# Patient Record
Sex: MALE | Race: BLACK OR AFRICAN AMERICAN | Employment: OTHER | RURAL
[De-identification: names, ages, dates, MRNs, and addresses within clinical notes are randomized per-mention and may not be internally consistent; named-entity substitution may affect disease eponyms.]

---

## 2016-10-14 LAB
HEMOCCULT STL QL: NEGATIVE
LDL-C, EXTERNAL: 115

## 2017-08-02 ENCOUNTER — OFFICE VISIT (OUTPATIENT)
Dept: INTERNAL MEDICINE CLINIC | Age: 67
End: 2017-08-02

## 2017-08-02 VITALS
OXYGEN SATURATION: 100 % | HEIGHT: 66 IN | WEIGHT: 124.2 LBS | HEART RATE: 64 BPM | BODY MASS INDEX: 19.96 KG/M2 | DIASTOLIC BLOOD PRESSURE: 87 MMHG | SYSTOLIC BLOOD PRESSURE: 147 MMHG | TEMPERATURE: 95.4 F | RESPIRATION RATE: 17 BRPM

## 2017-08-02 DIAGNOSIS — R56.9 SEIZURES (HCC): ICD-10-CM

## 2017-08-02 DIAGNOSIS — Z76.89 ENCOUNTER TO ESTABLISH CARE: ICD-10-CM

## 2017-08-02 DIAGNOSIS — F32.A DEPRESSION, UNSPECIFIED DEPRESSION TYPE: ICD-10-CM

## 2017-08-02 DIAGNOSIS — I10 ESSENTIAL HYPERTENSION: Primary | ICD-10-CM

## 2017-08-02 RX ORDER — SERTRALINE HYDROCHLORIDE 50 MG/1
50 TABLET, FILM COATED ORAL DAILY
Qty: 30 TAB | Refills: 5 | Status: SHIPPED | OUTPATIENT
Start: 2017-08-02

## 2017-08-02 RX ORDER — HYDROCHLOROTHIAZIDE 25 MG/1
25 TABLET ORAL DAILY
Qty: 90 TAB | Refills: 1 | Status: SHIPPED | OUTPATIENT
Start: 2017-08-02

## 2017-08-02 RX ORDER — PHENYTOIN SODIUM 100 MG/1
100 CAPSULE, EXTENDED RELEASE ORAL DAILY
Qty: 30 CAP | Refills: 5 | Status: SHIPPED | OUTPATIENT
Start: 2017-08-02

## 2017-08-02 NOTE — PROGRESS NOTES
HISTORY OF PRESENT ILLNESS  Taylor Boas is a 61 y.o. male. HPI  Chief Complaint   Patient presents with    New Patient     ESTABLISH CARE    Homeless     STAYING WITH SON, BUT IS LOOKING FOR OWN PLACE;  CURRENTLY PLACED ON WAITING LIST    Depression     WEIGHT LOSS     Past Medical History:   Diagnosis Date    Depression     High cholesterol     Hypertension     Seizure ([address]      Social History     Social History    Marital status: SINGLE     Spouse name: N/A    Number of children: N/A    Years of education: N/A     Occupational History    Not on file. Social History Main Topics    Smoking status: Current Every Day Smoker     Packs/day: 1.50    Smokeless tobacco: Never Used    Alcohol use No    Drug use: Yes     Special: Marijuana      Comment: No longer participate in illicit drugs    Sexual activity: No     Other Topics Concern    Not on file     Social History Narrative    No narrative on file     Review of Systems   Constitutional: Positive for malaise/fatigue and weight loss. Negative for chills, diaphoresis and fever. HENT: Negative. Negative for congestion, ear pain and sore throat. Eyes: Negative. Respiratory: Negative for cough, shortness of breath and wheezing. Cardiovascular: Negative. Negative for chest pain and leg swelling. Gastrointestinal: Negative for abdominal pain, diarrhea, nausea and vomiting. Skin: Negative. Neurological: Negative. Negative for headaches. Psychiatric/Behavioral: Positive for depression. Negative for substance abuse and suicidal ideas. The patient has insomnia. Physical Exam   Constitutional: He is oriented to person, place, and time. He appears well-developed and well-nourished. No distress. HENT:   Head: Normocephalic and atraumatic. Eyes: Conjunctivae are normal.   Neck: Normal range of motion. Neck supple. Cardiovascular: Normal rate, regular rhythm, normal heart sounds and intact distal pulses.   Exam reveals no gallop and no friction rub. No murmur heard. Pulmonary/Chest: Effort normal and breath sounds normal. No respiratory distress. He has no wheezes. Musculoskeletal: Normal range of motion. Neurological: He is alert and oriented to person, place, and time. Skin: Skin is warm and dry. He is not diaphoretic. Psychiatric: He has a normal mood and affect. His behavior is normal.   Nursing note and vitals reviewed. Plan of care and AVS reviewed with patient who verbalized understanding. ASSESSMENT and PLAN    ICD-10-CM ICD-9-CM    1. Essential hypertension I10 401.9 hydroCHLOROthiazide (HYDRODIURIL) 25 mg tablet   2. Encounter to establish care Z76.89 V65.8    3. Seizures (HCC) R56.9 780.39 phenytoin ER (DILANTIN ER) 100 mg ER capsule   4. Depression, unspecified depression type F32.9 311 sertraline (ZOLOFT) 50 mg tablet   F/u 2 weeks BP check with nurse. F/u 1 month seizure and depression.

## 2017-08-02 NOTE — PROGRESS NOTES
Chief Complaint   Patient presents with    New Patient     ESTABLISH CARE    Homeless     STAYING WITH SON, BUT IS LOOKING FOR OWN PLACE;  CURRENTLY PLACED ON WAITING LIST    Depression     WEIGHT LOSS       There are no preventive care reminders to display for this patient.

## 2017-08-02 NOTE — MR AVS SNAPSHOT
Visit Information Date & Time Provider Department Dept. Phone Encounter #  
 8/2/2017 10:30 AM [facility]  Follow-up Instructions Return in about 1 month (around 9/2/2017) for 2 weeks for HTN/Nurse. Upcoming Health Maintenance Date Due Hepatitis C Screening 1950 DTaP/Tdap/Td series (1 - Tdap) 9/8/1973 FOBT Q 1 YEAR AGE 50-75 9/8/2002 ZOSTER VACCINE AGE 60> 7/8/2012 Allergies as of 8/2/2017  Review Complete On: 8/2/2017 By: Cruz Farmer NP Severity Noted Reaction Type Reactions Other Medication High 08/02/2017   Side Effect Anaphylaxis Patient has an allergic reaction to a medication, but is unable to recall the name of medication. Current Immunizations  Never Reviewed No immunizations on file. Not reviewed this visit You Were Diagnosed With   
  
 Codes Comments Encounter to establish care    -  Primary ICD-10-CM: Z76.89 
ICD-9-CM: V65.8 Essential hypertension     ICD-10-CM: I10 
ICD-9-CM: 401.9 Seizures ([facility]     ICD-10-CM: R56.9 ICD-9-CM: 780.39 Depression, unspecified depression type     ICD-10-CM: F32.9 ICD-9-CM: 123 Vitals BP Pulse Temp Resp Height(growth percentile) Weight(growth percentile) 147/87 (BP 1 Location: Left arm, BP Patient Position: Sitting) 64 95.4 °F (35.2 °C) (Oral) 17 5' 5.5\" (1.664 m) 124 lb 3.2 oz (56.3 kg) SpO2 BMI Smoking Status 100% 20.35 kg/m2 Current Every Day Smoker Vitals History BMI and BSA Data Body Mass Index Body Surface Area  
 20.35 kg/m 2 1.61 m 2 Preferred Pharmacy Pharmacy Name Phone [facility] [address] [facility], [facility] [address] [facility] [phone] Your Updated Medication List  
  
   
This list is accurate as of: 8/2/17 10:53 AM.  Always use your most recent med list.  
  
  
  
  
 hydroCHLOROthiazide 25 mg tablet Commonly known as:  HYDRODIURIL Take 1 Tab by mouth daily. phenytoin  mg ER capsule Commonly known as:  DILANTIN ER Take 1 Cap by mouth daily. sertraline 50 mg tablet Commonly known as:  ZOLOFT Take 1 Tab by mouth daily. Prescriptions Sent to Pharmacy Refills  
 hydroCHLOROthiazide (HYDRODIURIL) 25 mg tablet 1 Sig: Take 1 Tab by mouth daily. Class: Normal  
 Pharmacy: [facility] [address] [facility], [facility] [address] [facility] Ph #: [phone] Route: Oral  
 sertraline (ZOLOFT) 50 mg tablet 5 Sig: Take 1 Tab by mouth daily. Class: Normal  
 Pharmacy: [facility] [address] [facility], [facility] [address] [facility] Ph #: [phone] Route: Oral  
 phenytoin ER (DILANTIN ER) 100 mg ER capsule 5 Sig: Take 1 Cap by mouth daily. Class: Normal  
 Pharmacy: [facility] [address] [facility], [facility] [address] [facility] Ph #: [phone] Route: Oral  
  
Follow-up Instructions Return in about 1 month (around 9/2/2017) for 2 weeks for HTN/Nurse. Introducing \[facility]\"" & HEALTH SERVICES! Kamlesh Zhang introduces [employer] patient portal. Now you can access parts of your medical record, email your doctor's office, and request medication refills online. 1. In your internet browser, go to [email or web address] 2. Click on the First Time User? Click Here link in the Sign In box. You will see the New Member Sign Up page. 3. Enter your [employer] Access Code exactly as it appears below. You will not need to use this code after youve completed the sign-up process. If you do not sign up before the expiration date, you must request a new code. · [employer] Access Code: [facility] Expires: 10/31/2017  9:58 AM 
 
4. Enter the last four digits of your Social Security Number (xxxx) and Date of Birth (mm/dd/yyyy) as indicated and click Submit. You will be taken to the next sign-up page. 5. Create a [email or web address] ID. This will be your [email or web address] login ID and cannot be changed, so think of one that is secure and easy to remember. 6. Create a [email or web address] password. You can change your password at any time. 7. Enter your Password Reset Question and Answer. This can be used at a later time if you forget your password. 8. Enter your e-mail address. You will receive e-mail notification when new information is available in [address] 9. Click Sign Up. You can now view and download portions of your medical record. 10. Click the Download Summary menu link to download a portable copy of your medical information. If you have questions, please visit the Frequently Asked Questions section of the [email or web address] website. Remember, [email or web address] is NOT to be used for urgent needs. For medical emergencies, dial 911. Now available from your iPhone and Android! Please provide this summary of care documentation to your next provider. Your primary care clinician is listed as Pilo Terry. If you have any questions after today's visit, please call [phone].

## 2017-08-03 ENCOUNTER — DOCUMENTATION ONLY (OUTPATIENT)
Dept: INTERNAL MEDICINE CLINIC | Age: 67
End: 2017-08-03

## 2017-09-08 ENCOUNTER — OFFICE VISIT (OUTPATIENT)
Dept: INTERNAL MEDICINE CLINIC | Age: 67
End: 2017-09-08

## 2017-09-08 VITALS
SYSTOLIC BLOOD PRESSURE: 128 MMHG | WEIGHT: 123.2 LBS | DIASTOLIC BLOOD PRESSURE: 86 MMHG | RESPIRATION RATE: 16 BRPM | TEMPERATURE: 98.5 F | OXYGEN SATURATION: 98 % | HEIGHT: 66 IN | BODY MASS INDEX: 19.8 KG/M2 | HEART RATE: 83 BPM

## 2017-09-08 DIAGNOSIS — R56.9 SEIZURES (HCC): Primary | ICD-10-CM

## 2017-09-08 DIAGNOSIS — I10 ESSENTIAL HYPERTENSION: ICD-10-CM

## 2017-09-08 RX ORDER — PHENYTOIN 50 MG/1
TABLET, CHEWABLE ORAL
Qty: 30 TAB | Refills: 5 | Status: SHIPPED | OUTPATIENT
Start: 2017-09-08 | End: 2021-11-09

## 2017-09-08 RX ORDER — LISINOPRIL 5 MG/1
5 TABLET ORAL DAILY
Qty: 30 TAB | Refills: 5 | Status: SHIPPED | OUTPATIENT
Start: 2017-09-08

## 2017-09-08 NOTE — MR AVS SNAPSHOT
Visit Information Date & Time Provider Department Dept. Phone Encounter #  
 9/8/2017  2:15 PM Thomas Mchugh, 1 [facility] [phone] [id number] Follow-up Instructions Return in about 3 months (around 12/8/2017) for HTN. Upcoming Health Maintenance Date Due  
 GLAUCOMA SCREENING Q2Y 9/8/2017 Pneumococcal 65+ Low/Medium Risk (1 of 2 - PCV13) 9/8/2017 MEDICARE YEARLY EXAM 9/8/2017 FOBT Q 1 YEAR AGE 50-75 10/14/2017 DTaP/Tdap/Td series (2 - Td) 8/2/2027 Allergies as of 9/8/2017  Review Complete On: 9/8/2017 By: Thomas Mchugh NP Severity Noted Reaction Type Reactions Other Medication High 08/02/2017   Side Effect Anaphylaxis Patient has an allergic reaction to a medication, but is unable to recall the name of medication. Current Immunizations  Reviewed on 8/6/2017 No immunizations on file. Not reviewed this visit You Were Diagnosed With   
  
 Codes Comments Seizures ([facility]    -  Primary ICD-10-CM: R56.9 ICD-9-CM: 780.39 Essential hypertension     ICD-10-CM: I10 
ICD-9-CM: 401.9 Vitals BP Pulse Temp Resp Height(growth percentile) Weight(growth percentile) (!) 145/91 (BP 1 Location: Left arm, BP Patient Position: Sitting) 85 98.5 °F (36.9 °C) (Oral) 16 5' 5.5\" (1.664 m) 123 lb 3.2 oz (55.9 kg) SpO2 BMI Smoking Status 98% 20.19 kg/m2 Current Every Day Smoker Vitals History BMI and BSA Data Body Mass Index Body Surface Area  
 20.19 kg/m 2 1.61 m 2 Preferred Pharmacy Pharmacy Name Phone [facility] [address] [facility], [address] [facility] [phone] Your Updated Medication List  
  
   
This list is accurate as of: 9/8/17  2:35 PM.  Always use your most recent med list.  
  
  
  
  
 hydroCHLOROthiazide 25 mg tablet Commonly known as:  HYDRODIURIL Take 1 Tab by mouth daily. lisinopril 5 mg tablet Commonly known as:  Burma Fairy Take 1 Tab by mouth daily. phenytoin 50 mg chewable tablet Commonly known as:  DILANTIN Take with 100 mg tab to equal 150 mg daily. phenytoin  mg ER capsule Commonly known as:  DILANTIN ER Take 1 Cap by mouth daily. sertraline 50 mg tablet Commonly known as:  ZOLOFT Take 1 Tab by mouth daily. Prescriptions Sent to Pharmacy Refills  
 phenytoin (DILANTIN) 50 mg chewable tablet 5 Sig: Take with 100 mg tab to equal 150 mg daily. Class: Normal  
 Pharmacy: [address], [address] [facility] Ph #: [phone]  
 lisinopril (PRINIVIL, ZESTRIL) 5 mg tablet 5 Sig: Take 1 Tab by mouth daily. Class: Normal  
 Pharmacy: [address], [address] [facility] Ph #: [phone] Route: Oral  
  
Follow-up Instructions Return in about 3 months (around 12/8/2017) for HTN. To-Do List   
 09/08/2017 Lab:  PHENYTOIN Introducing [facility] SERVICES! Khadar York introduces [employer] patient portal. Now you can access parts of your medical record, email your doctor's office, and request medication refills online. 1. In your internet browser, go to [email or web address] 2. Click on the First Time User? Click Here link in the Sign In box. You will see the New Member Sign Up page. 3. Enter your [employer] Access Code exactly as it appears below. You will not need to use this code after youve completed the sign-up process. If you do not sign up before the expiration date, you must request a new code. · [employer] Access Code: [facility] Expires: 10/31/2017  9:58 AM 
 
4. Enter the last four digits of your Social Security Number (xxxx) and Date of Birth (mm/dd/yyyy) as indicated and click Submit. You will be taken to the next sign-up page. 5. Create a [employer] App[employer] ID.  This will be your [employer] login ID and cannot be changed, so think of one that is secure and easy to remember. 6. Create a [employer] password. You can change your password at any time. 7. Enter your Password Reset Question and Answer. This can be used at a later time if you forget your password. 8. Enter your e-mail address. You will receive e-mail notification when new information is available in [address] 9. Click Sign Up. You can now view and download portions of your medical record. 10. Click the Download Summary menu link to download a portable copy of your medical information. If you have questions, please visit the Frequently Asked Questions section of the [employer] website. Remember, [employer] is NOT to be used for urgent needs. For medical emergencies, dial 911. Now available from your iPhone and Android! Please provide this summary of care documentation to your next provider. Your primary care clinician is listed as Roman Doctor. If you have any questions after today's visit, please call [phone].

## 2017-09-08 NOTE — PROGRESS NOTES
Chief Complaint   Patient presents with    Hypertension     FOLLOW UP    Seizure     FOLLOW UP; HAD SLIGHT TWO DAYS AGO     1. Have you been to the ER, urgent care clinic since your last visit? Hospitalized since your last visit? No    2. Have you seen or consulted any other health care providers outside of the [address] [place] [address] since your last visit? Include any pap smears or colon screening. No     Health Maintenance Due   Topic Date Due    GLAUCOMA SCREENING Q2Y  09/08/2017    Pneumococcal 65+ Low/Medium Risk (1 of 2 - PCV13) 09/08/2017    MEDICARE YEARLY EXAM  09/08/2017    FOBT Q 1 YEAR AGE 50-75  10/14/2017     Patient requests flu vaccine. Do you have an [address] in place in the event that you have a healthcare crisis that could impact your decision making as it pertains to your health? NO    Would you like information about Advance Care Planning? YES    Information given.  YES

## 2017-09-08 NOTE — PROGRESS NOTES
HISTORY OF PRESENT ILLNESS  Alannah Grimaldo is a 74 y.o. male. HPI  Chief Complaint   Patient presents with    Hypertension     FOLLOW UP    Seizure     FOLLOW UP; HAD SLIGHT TWO DAYS AGO   States has been taking fluid pill but has been having HA    States had slight seizure 2 days ago. States has not followed up with neurologist.  States can tell when is going to have a seizure but can't stop it. Patient states has not applied for medicare. Feels depression is better. Review of Systems   Constitutional: Negative. Negative for chills and fever. HENT: Negative. Eyes: Negative. Respiratory: Negative. Negative for cough, shortness of breath and wheezing. Cardiovascular: Negative. Negative for chest pain and leg swelling. Gastrointestinal: Negative for abdominal pain, constipation, diarrhea, nausea and vomiting. Genitourinary: Negative. Musculoskeletal: Negative. Skin: Negative. Neurological: Positive for headaches. Physical Exam   Constitutional: He is oriented to person, place, and time. He appears well-developed and well-nourished. HENT:   Head: Normocephalic and atraumatic. Eyes: Conjunctivae are normal.   Neck: Normal range of motion. Neck supple. Cardiovascular: Normal rate, regular rhythm, normal heart sounds and intact distal pulses. Exam reveals no gallop and no friction rub. No murmur heard. Pulmonary/Chest: Effort normal and breath sounds normal. No respiratory distress. He has no wheezes. Musculoskeletal: Normal range of motion. He exhibits no edema or tenderness. Neurological: He is alert and oriented to person, place, and time. Skin: Skin is warm and dry. Nursing note and vitals reviewed. ASSESSMENT and PLAN    ICD-10-CM ICD-9-CM    1. Seizures ([facility]) R56.9 780.39 phenytoin (DILANTIN) 50 mg chewable tablet      PHENYTOIN   2.  Essential hypertension I10 401.9 lisinopril (PRINIVIL, ZESTRIL) 5 mg tablet   Plan of care and AVS reviewed with patient who verbalized understanding. Increased dilantin. Patient to apply for medicare. Advised patient will need to see specialist for seizures. Patient in agreement. Will give referral after lab results are back. Refuses flu vaccine as vaccine not covered.

## 2021-11-09 ENCOUNTER — HOSPITAL ENCOUNTER (OUTPATIENT)
Age: 71
Setting detail: OBSERVATION
Discharge: HOME | End: 2021-11-09
Attending: STUDENT IN AN ORGANIZED HEALTH CARE EDUCATION/TRAINING PROGRAM | Admitting: INTERNAL MEDICINE
Payer: MEDICARE

## 2021-11-09 VITALS
OXYGEN SATURATION: 100 % | HEART RATE: 85 BPM | DIASTOLIC BLOOD PRESSURE: 63 MMHG | RESPIRATION RATE: 18 BRPM | TEMPERATURE: 97.8 F | SYSTOLIC BLOOD PRESSURE: 120 MMHG

## 2021-11-09 PROBLEM — R04.2 HEMOPTYSIS: Status: ACTIVE | Noted: 2021-11-09

## 2021-11-09 LAB — PROCALCITONIN SERPL-MCNC: <0.05 NG/ML

## 2021-11-09 PROCEDURE — 84145 PROCALCITONIN (PCT): CPT

## 2021-11-09 PROCEDURE — 36415 COLL VENOUS BLD VENIPUNCTURE: CPT

## 2021-11-09 PROCEDURE — 74011250637 HC RX REV CODE- 250/637: Performed by: INTERNAL MEDICINE

## 2021-11-09 PROCEDURE — G0378 HOSPITAL OBSERVATION PER HR: HCPCS

## 2021-11-09 RX ORDER — ONDANSETRON 2 MG/ML
4 INJECTION INTRAMUSCULAR; INTRAVENOUS
Status: DISCONTINUED | OUTPATIENT
Start: 2021-11-09 | End: 2021-11-09 | Stop reason: HOSPADM

## 2021-11-09 RX ORDER — SERTRALINE HYDROCHLORIDE 50 MG/1
50 TABLET, FILM COATED ORAL DAILY
Status: DISCONTINUED | OUTPATIENT
Start: 2021-11-09 | End: 2021-11-09 | Stop reason: HOSPADM

## 2021-11-09 RX ORDER — AZITHROMYCIN 250 MG/1
500 TABLET, FILM COATED ORAL
Status: COMPLETED | OUTPATIENT
Start: 2021-11-09 | End: 2021-11-09

## 2021-11-09 RX ORDER — LISINOPRIL 5 MG/1
5 TABLET ORAL DAILY
Status: DISCONTINUED | OUTPATIENT
Start: 2021-11-09 | End: 2021-11-09 | Stop reason: HOSPADM

## 2021-11-09 RX ORDER — ACETAMINOPHEN 325 MG/1
650 TABLET ORAL
Status: DISCONTINUED | OUTPATIENT
Start: 2021-11-09 | End: 2021-11-09 | Stop reason: HOSPADM

## 2021-11-09 RX ORDER — SODIUM CHLORIDE 0.9 % (FLUSH) 0.9 %
5-40 SYRINGE (ML) INJECTION EVERY 8 HOURS
Status: DISCONTINUED | OUTPATIENT
Start: 2021-11-09 | End: 2021-11-09 | Stop reason: HOSPADM

## 2021-11-09 RX ORDER — POLYETHYLENE GLYCOL 3350 17 G/17G
17 POWDER, FOR SOLUTION ORAL DAILY PRN
Status: DISCONTINUED | OUTPATIENT
Start: 2021-11-09 | End: 2021-11-09 | Stop reason: HOSPADM

## 2021-11-09 RX ORDER — AZITHROMYCIN 250 MG/1
250 TABLET, FILM COATED ORAL DAILY
Status: DISCONTINUED | OUTPATIENT
Start: 2021-11-10 | End: 2021-11-09 | Stop reason: HOSPADM

## 2021-11-09 RX ORDER — AZITHROMYCIN 250 MG/1
250 TABLET, FILM COATED ORAL DAILY
Qty: 4 TABLET | Refills: 0 | Status: SHIPPED | OUTPATIENT
Start: 2021-11-09 | End: 2021-11-13

## 2021-11-09 RX ORDER — SODIUM CHLORIDE 0.9 % (FLUSH) 0.9 %
5-40 SYRINGE (ML) INJECTION AS NEEDED
Status: DISCONTINUED | OUTPATIENT
Start: 2021-11-09 | End: 2021-11-09 | Stop reason: HOSPADM

## 2021-11-09 RX ORDER — ACETAMINOPHEN 650 MG/1
650 SUPPOSITORY RECTAL
Status: DISCONTINUED | OUTPATIENT
Start: 2021-11-09 | End: 2021-11-09 | Stop reason: HOSPADM

## 2021-11-09 RX ORDER — PROMETHAZINE HYDROCHLORIDE 25 MG/1
12.5 TABLET ORAL
Status: DISCONTINUED | OUTPATIENT
Start: 2021-11-09 | End: 2021-11-09 | Stop reason: HOSPADM

## 2021-11-09 RX ADMIN — LISINOPRIL 5 MG: 5 TABLET ORAL at 09:46

## 2021-11-09 RX ADMIN — SERTRALINE 50 MG: 50 TABLET, FILM COATED ORAL at 09:46

## 2021-11-09 RX ADMIN — AZITHROMYCIN 500 MG: 250 TABLET, FILM COATED ORAL at 11:39

## 2021-11-09 RX ADMIN — EXTENDED PHENYTOIN SODIUM 150 MG: 30 CAPSULE ORAL at 09:46

## 2021-11-09 NOTE — H&P
Hospitalist Admission Note    NAME: Tania Lin   :  1950   MRN:  [id number]     Date/Time:  2021 4:02 AM    Patient PCP: None  ______________________________________________________________________  Given the patient's current clinical presentation, I have a high level of concern for decompensation if discharged from the emergency department. Complex decision making was performed, which includes reviewing the patient's available past medical records, laboratory results, and x-ray films. My assessment of this patient's clinical condition and my plan of care is as follows. Assessment / Plan:    Right lung nodule, POA  Hemoptysis, POA  Groundglass opacities, POA  Transferred from [facility] due to concern of lung cancer  Presented there with hemoptysis, right lung nodule 8 mm was seen on CT chest with groundglass opacities  Active smoker  Patient was not sure if he had screening for lung cancer in the past  Denies any fevers or chills  No leukocytosis  CT neck and abdomen with no acute abnormality reported  Counseling was provided about smoke cessation  Consult pulmonology to eval for bronchoscopy or EBUS  Maintain n.p.o.  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   Patient reported that he has been coughing blood for the last few weeks and became more frequent recently for which he decided to go to the emergency department at [address] [place] [address], CT neck reported no acute abnormality, CT abdomen reported no acute abnormalities, however CT chest reported right groundglass opacities and 8 mm lung nodule. It was documented that [facility] ED physician Dr. Milvia Mcgill pulmonologist who recommended further work-up. There was also documentation that the patient left AMA when I asked the patient he denied and he said they were not communicating with him. Upon arrival to [facility], patient was hemodynamically stable on room air. Patient also reported that he was having left arm pain for the last few years. We were asked to admit for work up and evaluation of the above problems. Past Medical History:   Diagnosis Date    Depression     High cholesterol     Hypertension     Seizure ([address]         No past surgical history on file. Social History     Tobacco Use    Smoking status: Current Every Day Smoker     Packs/day: 0.50    Smokeless tobacco: Never Used   Substance Use Topics    Alcohol use: No        Family History   Problem Relation Age of Onset    Cancer Mother     Heart Disease Father      Allergies   Allergen Reactions    Other Medication Anaphylaxis     Patient has an allergic reaction to a medication, but is unable to recall the name of medication.  Pcn [Penicillins] Hives        Prior to Admission medications    Medication Sig Start Date End Date Taking? Authorizing Provider   phenytoin (DILANTIN) 50 mg chewable tablet Take with 100 mg tab to equal 150 mg daily. 9/8/17   Tj Duke NP   lisinopril (PRINIVIL, ZESTRIL) 5 mg tablet Take 1 Tab by mouth daily. 9/8/17   Tj Duke NP   hydroCHLOROthiazide (HYDRODIURIL) 25 mg tablet Take 1 Tab by mouth daily.  8/2/17   Tj Duke NP   sertraline (ZOLOFT) 50 mg tablet Take 1 Tab by mouth daily. 8/2/17   Yuri Ruiz NP   phenytoin ER (DILANTIN ER) 100 mg ER capsule Take 1 Cap by mouth daily. 8/2/17   Yuri Ruiz NP       REVIEW OF SYSTEMS:     I am not able to complete the review of systems because: The patient is intubated and sedated    The patient has altered mental status due to his acute medical problems    The patient has baseline aphasia from prior stroke(s)    The patient has baseline dementia and is not reliable historian    The patient is in acute medical distress and unable to provide information           Total of 12 systems reviewed as follows:       POSITIVE= underlined text  Negative = text not underlined  General:  fever, chills, sweats, generalized weakness, weight loss/gain,      loss of appetite   Eyes:    blurred vision, eye pain, loss of vision, double vision  ENT:    rhinorrhea, pharyngitis   Respiratory:   cough, sputum production, SOB, EVANS, wheezing, pleuritic pain, hemoptysis  Cardiology:   chest pain, palpitations, orthopnea, PND, edema, syncope   Gastrointestinal:  abdominal pain , N/V, diarrhea, dysphagia, constipation, bleeding   Genitourinary:  frequency, urgency, dysuria, hematuria, incontinence   Muskuloskeletal :  arthralgia, myalgia, back pain  Hematology:  easy bruising, nose or gum bleeding, lymphadenopathy   Dermatological: rash, ulceration, pruritis, color change / jaundice  Endocrine:   hot flashes or polydipsia   Neurological:  headache, dizziness, confusion, focal weakness, paresthesia,     Speech difficulties, memory loss, gait difficulty  Psychological: Feelings of anxiety, depression, agitation    Objective:   VITALS:    Visit Vitals  /68   Pulse 86   Temp 97.4 °F (36.3 °C)   Resp 18       PHYSICAL EXAM:    General:    Alert, cooperative, no distress, appears stated age.      HEENT: Atraumatic, anicteric sclerae, pink conjunctivae     No oral ulcers, mucosa moist, throat clear, dentition fair  Neck:  Supple, symmetrical,  thyroid: non tender  Lungs:   Clear to auscultation bilaterally. No Wheezing or Rhonchi. No rales. Chest wall:  No tenderness  No Accessory muscle use. Heart:   Regular  rhythm,  No  murmur   No edema  Abdomen:   Soft, non-tender. Not distended. Bowel sounds normal  Extremities: No cyanosis. No clubbing,      Skin turgor normal, Capillary refill normal, Radial dial pulse 2+  Skin:     Not pale. Not Jaundiced  No rashes   Psych:  Good insight. Not depressed. Not anxious or agitated. Neurologic: EOMs intact. No facial asymmetry. No aphasia or slurred speech. Symmetrical strength, Sensation grossly intact. Alert and oriented X 4.     _______________________________________________________________________  Care Plan discussed with:    Comments   Patient x    Family      RN x    Care Manager                    Consultant:      _______________________________________________________________________  Expected  Disposition:   Home with Family x   HH/PT/OT/RN    SNF/LTC    [facility]    ________________________________________________________________________  TOTAL TIME:  61 Minutes    Critical Care Provided     Minutes non procedure based      Comments    x Reviewed previous records   >50% of visit spent in counseling and coordination of care x Discussion with patient and/or family and questions answered       ________________________________________________________________________  Signed: Ryan Cobian MD    Procedures: see electronic medical records for all procedures/Xrays and details which were not copied into this note but were reviewed prior to creation of Plan. LAB DATA REVIEWED:    No results found for this or any previous visit (from the past 24 hour(s)).

## 2021-11-09 NOTE — PROGRESS NOTES
End of Shift Note    Bedside shift change report given to Rosalina Cárdenas  (oncoming nurse) by Severiano Hoe, RN (offgoing nurse). Report included the following information SBAR, Kardex and MAR    Shift worked:  7pm-7am     Shift summary and any significant changes:     patient is a transfer from [facility]. Refused for writer to assess sacrum. Little pain reported to left arm. Non productive cough noted this shift. Vital signs stable. Concerns for physician to address:       Zone phone for oncoming shift:          Activity:  Activity Level: Up ad sam  Number times ambulated in hallways past shift: 0  Number of times OOB to chair past shift: 0    Cardiac:   Cardiac Monitoring: Yes      Cardiac Rhythm: Sinus Rhythm    Access:   Current line(s): PIV     Genitourinary:   Urinary status: voiding    Respiratory:   O2 Device: None (Room air)  Chronic home O2 use?: NO  Incentive spirometer at bedside:      GI:     Current diet:  DIET NPO  Passing flatus: YES  Tolerating current diet: YES       Pain Management:   Patient states pain is manageable on current regimen: YES    Skin:  Abdoulaye Score: 20  Interventions: increase time out of bed    Patient Safety:  Fall Score:  Total Score: 2  Interventions: gripper socks       Length of Stay:  Expected LOS: - - -  Actual LOS: 1      Severiano Hoe, AUBREY

## 2021-11-09 NOTE — DISCHARGE INSTRUCTIONS
Patient Education        Coughing Up Blood: Care Instructions  Your Care Instructions     Coughing up blood can be frightening. The blood may come from the lungs, stomach, or throat. You may cough up a few thin streaks of bright red blood. This probably is not a cause for concern. Coughing up large amounts of bright red blood or rust-colored mucus from the lungs can be a symptom of a more serious condition. Several conditions can make you cough up blood from the lungs. These include bronchitis and pneumonia, or more serious problems such as cancer or a blood clot in the lung (pulmonary embolus). Depending on what is causing your cough, it may go away after the illness is treated. Your doctor may tell you not to suppress the cough with cough medicine if it is better for you to cough up the blood and spit it out. Follow-up care is a key part of your treatment and safety. Be sure to make and go to all appointments, and call your doctor if you are having problems. It's also a good idea to know your test results and keep a list of the medicines you take. How can you care for yourself at home? · Make a note of when and for how long you cough up blood. Also note if you are coughing up spit with a small amount of blood, or mostly blood. Take this information to your next appointment with your doctor. · Drink plenty of water. This helps keep the mucus thin and helps you cough it up. If you have kidney, heart, or liver disease and have to limit fluids, talk with your doctor before you increase your fluid intake. · If your doctor prescribed antibiotics, take them as directed. Do not stop taking them just because you feel better. You need to take the full course of antibiotics. · Do not take cough medicine without your doctor's guidance. They can cause problems if you have other health problems. They can also interact with other medicine. · Do not smoke or use other forms of tobacco, especially while you have a cough. Smoking can make coughing worse. If you need help quitting, talk to your doctor about stop-smoking programs and medicines. These can increase your chances of quitting for good. · Avoid exposure to smoke, dust, or other pollutants. When should you call for help? Call 911 anytime you think you may need emergency care. For example, call if:    · You have sudden chest pain and shortness of breath.     · You have severe trouble breathing. Call your doctor now or seek immediate medical care if:    · You have wheezing and difficulty breathing.     · You are dizzy or lightheaded, or you feel like you may faint.     · You cough up clots of blood. Watch closely for changes in your health, and be sure to contact your doctor if:    · You do not get better as expected.     · You have any new symptoms, such as chest pain with difficulty breathing or a fever. Where can you learn more? Go to http://www.gray.com/  Enter M550 in the search box to learn more about \"Coughing Up Blood: Care Instructions. \"  Current as of: 2021               Content Version: 13.0  © 3902-4700 [employer]. Care instructions adapted under license by [employer] (which disclaims liability or warranty for this information). If you have questions about a medical condition or this instruction, always ask your healthcare professional. [place] [address] any warranty or liability for your use of this information.                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 Pain Management: per above medications    Recommended diet: Regular Diet    Recommended activity: Activity as tolerated    Wound care: None    Indwelling devices:  None    Supplemental Oxygen: None    Required Lab work: Follow up with Pulmonology for repeat CT after antibiotics     Glucose management:  None    Code status: Full        Outside physician follow up: Follow-up Information     Follow up With Specialties Details Why Contact Info    Selma Clements MD Pulmonary Disease In 2 weeks  [address]  [facility]  [address]  [facility]  [phone]        In 6 days Follow up with your PCP              Pablo Alvarez to avoid frequent ED visits. [facility] can treat; pains, sprains, cuts, wounds, high fevers, upper respiratory infections and much more. There medical team is equipped with all the tools necessary to provide advanced medical care in the comfort of you home, workplace, or location of need. The medical team consists of doctors, nurse practitioners, and EMTs. [facility] is available 7 days per week 9 am to 9 pm.   Request care by calling [phone] or by going online at [employer]      Information obtained by :  I understand that if any problems occur once I am at home I am to contact my physician. I understand and acknowledge receipt of the instructions indicated above.                                                                                                                                            Physician's or R.N.'s Signature                                                                  Date/Time                                                                                                                                              Patient or Repres

## 2021-11-09 NOTE — CONSULTS
Pulmonary, Critical Care, and Sleep Medicine    Initial Patient Consult    Name: Leyla Renae MRN: [id number]   : 1950 Hospital: [facility]   Date: 2021        IMPRESSION:   · Hemoptysis? · Abnormal chest ct  · Pulmonary nodule      RECOMMENDATIONS:   · No obvious hemoptysis, not witnessed by nursing staff  · Pt given a cup to collect sample  · Will start a zpack  · No hypoxemia  · Pulmonary nodule is only 8mm and unlikely to cause hemoptysis     Subjective: This patient has been seen and evaluated at the request of Dr. Trinity Roca for hemoptysis. Patient is a 73 y.o. male smoker presented to [facility] with 4-5 weeks hx of hemoptysis  No other pulmonary complaints  Transferred to [facility] for eval  Today pt in no distress  No acute complaints      Past Medical History:   Diagnosis Date    Depression     High cholesterol     Hypertension     Seizure ([address]       No past surgical history on file. Prior to Admission medications    Medication Sig Start Date End Date Taking? Authorizing Provider   phenytoin (DILANTIN) 50 mg chewable tablet Take with 100 mg tab to equal 150 mg daily. 17   Erica Mccullough NP   lisinopril (PRINIVIL, ZESTRIL) 5 mg tablet Take 1 Tab by mouth daily. 17   Erica Mccullough NP   hydroCHLOROthiazide (HYDRODIURIL) 25 mg tablet Take 1 Tab by mouth daily. 17   Erica Mccullough NP   sertraline (ZOLOFT) 50 mg tablet Take 1 Tab by mouth daily. 17   Erica Mccullough NP   phenytoin ER (DILANTIN ER) 100 mg ER capsule Take 1 Cap by mouth daily. 17   Erica Mccullough NP     Allergies   Allergen Reactions    Other Medication Anaphylaxis     Patient has an allergic reaction to a medication, but is unable to recall the name of medication.  Pcn [Penicillins] Hives      Social History     Tobacco Use    Smoking status: Current Every Day Smoker     Packs/day: 0.50    Smokeless tobacco: Never Used   Substance Use Topics    Alcohol use:  No Family History   Problem Relation Age of Onset    Cancer Mother     Heart Disease Father         Current Facility-Administered Medications   Medication Dose Route Frequency    phenytoin ER (DILANTIN ER) ER capsule 150 mg  150 mg Oral DAILY    sertraline (ZOLOFT) tablet 50 mg  50 mg Oral DAILY    lisinopriL (PRINIVIL, ZESTRIL) tablet 5 mg  5 mg Oral DAILY    sodium chloride (NS) flush 5-40 mL  5-40 mL IntraVENous Q8H       Review of Systems:  A comprehensive review of systems was negative except for: Respiratory: positive for hemoptysis    Objective:   Vital Signs:    Visit Vitals  /63   Pulse 85   Temp 97.8 °F (36.6 °C)   Resp 18   SpO2 100%       O2 Device: None (Room air)       Temp (24hrs), Av.6 °F (36.4 °C), Min:97.4 °F (36.3 °C), Max:97.8 °F (36.6 °C)       Intake/Output:   Last shift:      No intake/output data recorded. Last 3 shifts: No intake/output data recorded. No intake or output data in the 24 hours ending 21 0909   Physical Exam:   General:  Alert, cooperative, no distress, appears stated age. Head:  Normocephalic, without obvious abnormality, atraumatic. Eyes:  Conjunctivae/corneas clear. PERRL, EOMs intact. Nose: Nares normal. Septum midline. Mucosa normal. No drainage or sinus tenderness. Throat: Lips, mucosa, and tongue normal. Teeth and gums normal.   Neck: Supple, symmetrical, trachea midline, no adenopathy, thyroid: no enlargment/tenderness/nodules, no carotid bruit and no JVD. Back:   Symmetric, no curvature. ROM normal.   Lungs:   Clear to auscultation bilaterally. Chest wall:  No tenderness or deformity. Heart:  Regular rate and rhythm, S1, S2 normal, no murmur, click, rub or gallop. Abdomen:   Soft, non-tender. Bowel sounds normal. No masses,  No organomegaly. Extremities: Extremities normal, atraumatic, no cyanosis or edema. Pulses: 2+ and symmetric all extremities.    Skin: Skin color, texture, turgor normal. No rashes or lesions   Lymph nodes: Cervical, supraclavicular, and axillary nodes normal.   Neurologic: Grossly nonfocal     Data review:     Recent Results (from the past 24 hour(s))   PROCALCITONIN    Collection Time: 11/09/21  4:27 AM   Result Value Ref Range    Procalcitonin <0.05 ng/mL       Imaging:  I have personally reviewed the patients radiographs and have reviewed the reports:  Chest ct reviewed        Angelina Hinds MD

## 2021-11-09 NOTE — PROGRESS NOTES
Physical Therapy Screening:  Services are not indicated at this time. [facility] based on results obtained during the nursing admission assessment. The patients chart was reviewed and the patient is not appropriate for a skilled therapy evaluation at this time. Please consult physical therapy if any therapy needs arise. Thank you.     Sebastian Baltazar, PT

## 2021-11-09 NOTE — PROGRESS NOTES
Pt is clear for d/c from a CM standpoint. Transition of Care Plan:    RUR:OBS  Disposition: Home  Follow up appointments: New PCP appt. DME needed: Pt stated he has a walker. Transportation at Discharge: Pt's son  [address] or means to access home:    yes    IM Medicare Letter: OBS   Is patient a BCPI-A Bundle:   n/a        If yes, was Bundle Letter given?:     Caregiver Contact:  Royal Quigley [phone]  Discharge Caregiver contacted prior to discharge? CM will contact prior to d/c if pt desires. Reason for Admission:  Hemoptysis                     RUR Score:     OBS                Plan for utilizing home health:      tbd    PCP: First and Last name:  None  Pt needs a new PCP appt. Name of Practice:    Are you a current patient: Yes/No:    Approximate date of last visit:    Can you participate in a virtual visit with your PCP:                     Current Advanced Directive/Advance Care Plan: Full Code    Advance Care Planning     General Advance Care Planning (ACP) Conversation      Date of Conversation: 11/9/21  Conducted with: Patient with [address]:   No healthcare decision makers have been documented. Click here to complete [address] including selection of the Healthcare Decision Maker Relationship (ie \"Primary\")      Content/Action Overview:   DECLINED ACP conversation - will revisit periodically   Reviewed DNR/DNI and patient elects Full Code (Attempt Resuscitation)      Length of Voluntary ACP Conversation in minutes:  <16 minutes (Non-Billable)    Jack Lima                                        Transition of Care Plan:      Home    2:23 p.m. The nurse from [facility] and Dr. Judge Rojo' office will call pt to schedule appts. 2:03 p.m. CM acknowledged d/c order. CM met with pt at bedside to discuss. Pt is going to have a family member transport him home.   CM left a message on his emergency contact's voice mail per pt's request.  CM will schedule pt for a new PCP appt. CM met with pt at bedside to introduce self/role, verify demographics, insurance and PCP. CM also discussed d/c plan. Pt is a 72 yo, ,  male who is under OBS at [facility] for his dx of Hemoptysis. Pt stated he has been living in a motel in [address] [facility] but plans on moving to [facility] where his spouse is at d/c. Pt stated he does not use any DME but does have a walker. Pt can complete his ADLs/IADLs independently at baseline. Pt does not drive d/t poor eyesight. Pt reported having a supportive spouse, kids and grandchildren. Pt denied a hx of HH, SNF or IPR. Pt's son will transport at d/c. CM will continue to assess for d/c needs. Care Management Interventions  PCP Verified by CM: No (Pt needs a new PCP appt. )  Mode of Transport at Discharge:  Other (see comment) (son)  Transition of Care Consult (CM Consult): Discharge Planning  Discharge Durable Medical Equipment: No  Physical Therapy Consult: No  Occupational Therapy Consult: No  Speech Therapy Consult: No  Support Systems: Spouse/Significant Other, Child(bassam)  Confirm Follow Up Transport: Family  The Patient and/or Patient Representative was Provided with a Choice of Provider and Agrees with the Discharge Plan?: Yes  Name of the Patient Representative Who was Provided with a Choice of Provider and Agrees with the Discharge Plan: [facility]  Discharge Location  Discharge Placement: [address], [place]  Care Management, [address]

## 2021-11-09 NOTE — DISCHARGE SUMMARY
Hospitalist Discharge Summary     Patient ID:  Kristyn Rocha  [id number]  73 y.o.  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 Patient reported that he has been coughing blood for the last few weeks and became more frequent recently for which he decided to go to the emergency department at [address] [place] [address], CT neck reported no acute abnormality, CT abdomen reported no acute abnormalities, however CT chest reported right groundglass opacities and 8 mm lung nodule. It was documented that [address] [facility] ED physician Dr. Donzella Kawasaki pulmonologist who recommended further work-up. There was also documentation that the patient left AMA when I asked the patient he denied and he said they were not communicating with him. Upon arrival to [facility], patient was hemodynamically stable on room air. Patient also reported that he was having left arm pain for the last few years.        We were asked to admit for work up and evaluation of the above problems. ______________________________________________________________________  DISCHARGE SUMMARY/HOSPITAL COURSE:  for full details see H&P, daily progress notes, labs, consult notes.      Right lung nodule, POA  Hemoptysis, POA  Groundglass opacities, POA  Transferred from [facility] due to concern of lung cancer  Presented there with hemoptysis, right lung nodule 8 mm was seen on CT chest with groundglass opacities  Active smoker  Patient was not sure if he had screening for lung cancer in the past  Denies any fevers or chills  No leukocytosis  CT neck and abdomen with no acute abnormality reported  Counseling was provided about smoke cessation at bedside   Appreciate Pulmonary input - Treat with antibiotics and follow up in the office for repeat imaging. 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 Patient's plan of care has been reviewed with them. Patient and/or family have verbally conveyed their understanding and agreement of the patient's signs, symptoms, diagnosis, treatment and prognosis and additionally agree to follow up as recommended or return to [facility] should their condition change prior to follow-up. Discharge instructions have also been provided to the patient with some educational information regarding their diagnosis as well a list of reasons why they would want to return to the office prior to their follow-up appointment should their condition change.    _______________________________________________________________________  Patient seen and examined by me on discharge day. Pertinent Findings:  Gen:    Not in distress  Chest: Clear lungs  CVS:   Regular rhythm. No edema  Abd:  Soft, not distended, not tender  Neuro:  Alert and oriented x3   _______________________________________________________________________  DISCHARGE MEDICATIONS:   Current Discharge Medication List      START taking these medications    Details   azithromycin (ZITHROMAX) 250 mg tablet Take 1 Tablet by mouth daily for 4 days.   Qty: 4 Tablet, Refills: 0  Start date: 11/9/2021, End date: 11/13/2021 CONTINUE these medications which have NOT CHANGED    Details   lisinopril (PRINIVIL, ZESTRIL) 5 mg tablet Take 1 Tab by mouth daily. Qty: 30 Tab, Refills: 5    Associated Diagnoses: Essential hypertension      hydroCHLOROthiazide (HYDRODIURIL) 25 mg tablet Take 1 Tab by mouth daily. Qty: 90 Tab, Refills: 1    Associated Diagnoses: Essential hypertension      sertraline (ZOLOFT) 50 mg tablet Take 1 Tab by mouth daily. Qty: 30 Tab, Refills: 5    Associated Diagnoses: Depression, unspecified depression type      phenytoin ER (DILANTIN ER) 100 mg ER capsule Take 1 Cap by mouth daily. Qty: 30 Cap, Refills: 5    Associated Diagnoses: Seizures ([address]         STOP taking these medications       phenytoin (DILANTIN) 50 mg chewable tablet Comments:   Reason for Stopping:                 Patient Follow Up Instructions:    Activity: Activity as tolerated  Diet: Regular Diet  Wound Care: None needed      Follow-up Information     Follow up With Specialties Details Why Contact Info    Selma Clements MD Pulmonary Disease In 2 weeks You will need to follow up with pulmonology for repeat imaging after you complete antibiotics  [address] [place] [address]  [facility]  [address]  [phone]        In 6 days Follow up with your PCP          ________________________________________________________________    Risk of deterioration: Low    Condition at Discharge:  Stable  __________________________________________________________________    Disposition  Home with family, no needs    ____________________________________________________________________    Code Status: Full Code  ___________________________________________________________________      Total time in minutes spent coordinating this discharge (includes going over instructions, follow-up, prescriptions, and preparing report for sign off to her PCP) :  >30 minutes    Signed:  Brandy Reyes NP

## 2021-11-09 NOTE — PROGRESS NOTES
Spoke to the daughter and she is requesting that information only be given to [facility] [employer], Joanne Antonio, LINDA Feliciano, and [facility].  Code 7807

## 2022-03-18 PROBLEM — I10 ESSENTIAL HYPERTENSION: Status: ACTIVE | Noted: 2017-09-08

## 2022-03-19 PROBLEM — R56.9 SEIZURES (HCC): Status: ACTIVE | Noted: 2017-09-08

## 2022-03-20 PROBLEM — R04.2 HEMOPTYSIS: Status: ACTIVE | Noted: 2021-11-09

## 2022-04-30 ENCOUNTER — HOSPITAL ENCOUNTER (EMERGENCY)
Age: 72
Discharge: HOME | End: 2022-04-30
Attending: EMERGENCY MEDICINE
Payer: MEDICARE

## 2022-04-30 ENCOUNTER — APPOINTMENT (OUTPATIENT)
Dept: GENERAL RADIOLOGY | Age: 72
End: 2022-04-30
Attending: EMERGENCY MEDICINE
Payer: MEDICARE

## 2022-04-30 VITALS
DIASTOLIC BLOOD PRESSURE: 63 MMHG | HEART RATE: 74 BPM | TEMPERATURE: 97.5 F | SYSTOLIC BLOOD PRESSURE: 120 MMHG | OXYGEN SATURATION: 98 % | WEIGHT: 110.23 LBS | RESPIRATION RATE: 16 BRPM | BODY MASS INDEX: 17.79 KG/M2

## 2022-04-30 DIAGNOSIS — R55 SYNCOPE AND COLLAPSE: Primary | ICD-10-CM

## 2022-04-30 LAB
ALBUMIN SERPL-MCNC: 3.5 G/DL (ref 3.5–5)
ALBUMIN/GLOB SERPL: 1.2 {RATIO} (ref 1.1–2.2)
ALP SERPL-CCNC: 57 U/L (ref 45–117)
ALT SERPL-CCNC: 14 U/L (ref 12–78)
ANION GAP SERPL CALC-SCNC: 14 MMOL/L (ref 5–15)
AST SERPL-CCNC: 15 U/L (ref 15–37)
BASOPHILS # BLD: 0 K/UL (ref 0–0.1)
BASOPHILS NFR BLD: 0 % (ref 0–1)
BILIRUB SERPL-MCNC: 0.9 MG/DL (ref 0.2–1)
BUN SERPL-MCNC: 11 MG/DL (ref 6–20)
BUN/CREAT SERPL: 9 (ref 12–20)
CALCIUM SERPL-MCNC: 8.5 MG/DL (ref 8.5–10.1)
CHLORIDE SERPL-SCNC: 102 MMOL/L (ref 97–108)
CO2 SERPL-SCNC: 25 MMOL/L (ref 21–32)
CREAT SERPL-MCNC: 1.25 MG/DL (ref 0.7–1.3)
D DIMER PPP FEU-MCNC: 0.27 MG/L FEU (ref 0–0.65)
DIFFERENTIAL METHOD BLD: ABNORMAL
EOSINOPHIL # BLD: 0.1 K/UL (ref 0–0.4)
EOSINOPHIL NFR BLD: 1 % (ref 0–7)
ERYTHROCYTE [DISTWIDTH] IN BLOOD BY AUTOMATED COUNT: 13.4 % (ref 11.5–14.5)
GLOBULIN SER CALC-MCNC: 2.9 G/DL (ref 2–4)
GLUCOSE SERPL-MCNC: 120 MG/DL (ref 65–100)
HCT VFR BLD AUTO: 36.8 % (ref 36.6–50.3)
HGB BLD-MCNC: 12.3 G/DL (ref 12.1–17)
IMM GRANULOCYTES # BLD AUTO: 0.1 K/UL (ref 0–0.04)
IMM GRANULOCYTES NFR BLD AUTO: 0 % (ref 0–0.5)
LYMPHOCYTES # BLD: 1.1 K/UL (ref 0.8–3.5)
LYMPHOCYTES NFR BLD: 8 % (ref 12–49)
MAGNESIUM SERPL-MCNC: 1.8 MG/DL (ref 1.6–2.4)
MCH RBC QN AUTO: 29.6 PG (ref 26–34)
MCHC RBC AUTO-ENTMCNC: 33.4 G/DL (ref 30–36.5)
MCV RBC AUTO: 88.7 FL (ref 80–99)
MONOCYTES # BLD: 1 K/UL (ref 0–1)
MONOCYTES NFR BLD: 8 % (ref 5–13)
NEUTS SEG # BLD: 11.2 K/UL (ref 1.8–8)
NEUTS SEG NFR BLD: 83 % (ref 32–75)
NRBC # BLD: 0 K/UL (ref 0–0.01)
NRBC BLD-RTO: 0 PER 100 WBC
PLATELET # BLD AUTO: 272 K/UL (ref 150–400)
PMV BLD AUTO: 9 FL (ref 8.9–12.9)
POTASSIUM SERPL-SCNC: 3.6 MMOL/L (ref 3.5–5.1)
PROT SERPL-MCNC: 6.4 G/DL (ref 6.4–8.2)
RBC # BLD AUTO: 4.15 M/UL (ref 4.1–5.7)
SODIUM SERPL-SCNC: 141 MMOL/L (ref 136–145)
TROPONIN-HIGH SENSITIVITY: 5 NG/L (ref 0–76)
WBC # BLD AUTO: 13.5 K/UL (ref 4.1–11.1)

## 2022-04-30 PROCEDURE — 85379 FIBRIN DEGRADATION QUANT: CPT

## 2022-04-30 PROCEDURE — 84484 ASSAY OF TROPONIN QUANT: CPT

## 2022-04-30 PROCEDURE — 99285 EMERGENCY DEPT VISIT HI MDM: CPT

## 2022-04-30 PROCEDURE — 36415 COLL VENOUS BLD VENIPUNCTURE: CPT

## 2022-04-30 PROCEDURE — 85025 COMPLETE CBC W/AUTO DIFF WBC: CPT

## 2022-04-30 PROCEDURE — 93005 ELECTROCARDIOGRAM TRACING: CPT

## 2022-04-30 PROCEDURE — 74011250636 HC RX REV CODE- 250/636: Performed by: EMERGENCY MEDICINE

## 2022-04-30 PROCEDURE — 71045 X-RAY EXAM CHEST 1 VIEW: CPT

## 2022-04-30 PROCEDURE — 83735 ASSAY OF MAGNESIUM: CPT

## 2022-04-30 PROCEDURE — 80053 COMPREHEN METABOLIC PANEL: CPT

## 2022-04-30 RX ADMIN — SODIUM CHLORIDE 1000 ML: 9 INJECTION, SOLUTION INTRAVENOUS at 14:26

## 2022-04-30 NOTE — ED PROVIDER NOTES
EMERGENCY DEPARTMENT HISTORY AND PHYSICAL EXAM      Date: 4/30/2022  Patient Name: Deb Bryan    History of Presenting Illness     Chief Complaint   Patient presents with    Syncope       History Provided By: Patient    HPI: Deb Bryan, 73 y.o. male with PMHx as noted below presents the emergency department for evaluation of syncopal episode. Per patient and family that witnessed the episode patient was at a cookout this evening, had been drinking beers when he suddenly reported feeling lightheaded and became diaphoretic. Family present was able to help him to the ground before he had a brief, witnessed syncopal episode. There is no shaking or seizure-like activity during the episode and no bowel/bladder dysfunction. Currently patient is entirely asymptomatic and has returned to baseline. Patient does report drinking approximately 9 beers today and he has not anything to eat. Patient also reports some chronic left neck and shoulder pain as well as some loss of vision in the left eye that is chronic and has been present for several years, no changes today. No new neurologic deficits today. Pt denies any other alleviating or exacerbating factors. Additionally, pt specifically denies any recent fever, chills, headache, nausea, vomiting, abdominal pain, CP, SOB,, numbness, weakness, BLE swelling, heart palpitations, urinary sxs, diarrhea, constipation, melena, hematochezia, cough, or congestion. PCP: None    Current Outpatient Medications   Medication Sig Dispense Refill    lisinopril (PRINIVIL, ZESTRIL) 5 mg tablet Take 1 Tab by mouth daily. 30 Tab 5    hydroCHLOROthiazide (HYDRODIURIL) 25 mg tablet Take 1 Tab by mouth daily. 90 Tab 1    sertraline (ZOLOFT) 50 mg tablet Take 1 Tab by mouth daily. 30 Tab 5    phenytoin ER (DILANTIN ER) 100 mg ER capsule Take 1 Cap by mouth daily.  30 Cap 5       Past History     Past Medical History:  Past Medical History:   Diagnosis Date    Depression     High cholesterol     Hypertension     Seizure [facility])        Past Surgical History:  No past surgical history on file. Family History:  Family History   Problem Relation Age of Onset    Cancer Mother     Heart Disease Father        Social History:  Social History     Tobacco Use    Smoking status: Current Every Day Smoker     Packs/day: 0.50    Smokeless tobacco: Never Used   Substance Use Topics    Alcohol use: No    Drug use: Yes     Types: Marijuana     Comment: No longer participate in illicit drugs       Allergies: Allergies   Allergen Reactions    Other Medication Anaphylaxis     Patient has an allergic reaction to a medication, but is unable to recall the name of medication.  Pcn [Penicillins] Hives         Review of Systems   Review of Systems  Constitutional: Negative for fever, chills, and fatigue. HENT: Negative for congestion, sore throat, rhinorrhea, sneezing and neck stiffness   Eyes: Negative for discharge and redness. Respiratory: Negative for  shortness of breath, wheezing   Cardiovascular: Negative for chest pain, palpitations   Gastrointestinal: Negative for nausea, vomiting, abdominal pain, constipation, diarrhea and blood in stool. Genitourinary: Negative for dysuria, hematuria, flank pain, decreased urine volume, discharge,   Musculoskeletal: Negative for myalgias or joint pain . Skin: Negative for rash or lesions . Neurological: Positive lightheadedness. Negative weakness,, numbness and headaches. Physical Exam   Physical Exam    GENERAL: alert and oriented, no acute distress  EYES: PEERL, No injection, discharge or icterus. ENT: Mucous membranes pink and moist.  NECK: Supple  LUNGS: Airway patent. Non-labored respirations. Breath sounds clear with good air entry bilaterally. HEART: Regular rate and rhythm. No peripheral edema  ABDOMEN: Non-distended and non-tender, without guarding or rebound.   SKIN:  warm, dry  MSK/EXTREMITIES: Without swelling, tenderness or deformity, symmetric with normal ROM  NEUROLOGICAL: Alert, oriented      Diagnostic Study Results     Labs -     Recent Results (from the past 12 hour(s))   CBC WITH AUTOMATED DIFF    Collection Time: 04/30/22  2:24 PM   Result Value Ref Range    WBC 13.5 (H) 4.1 - 11.1 K/uL    RBC 4.15 4.10 - 5.70 M/uL    HGB 12.3 12.1 - 17.0 g/dL    HCT 36.8 36.6 - 50.3 %    MCV 88.7 80.0 - 99.0 FL    MCH 29.6 26.0 - 34.0 PG    MCHC 33.4 30.0 - 36.5 g/dL    RDW 13.4 11.5 - 14.5 %    PLATELET 467 260 - 489 K/uL    MPV 9.0 8.9 - 12.9 FL    NRBC 0.0 0  WBC    ABSOLUTE NRBC 0.00 0.00 - 0.01 K/uL    NEUTROPHILS 83 (H) 32 - 75 %    LYMPHOCYTES 8 (L) 12 - 49 %    MONOCYTES 8 5 - 13 %    EOSINOPHILS 1 0 - 7 %    BASOPHILS 0 0 - 1 %    IMMATURE GRANULOCYTES 0 0.0 - 0.5 %    ABS. NEUTROPHILS 11.2 (H) 1.8 - 8.0 K/UL    ABS. LYMPHOCYTES 1.1 0.8 - 3.5 K/UL    ABS. MONOCYTES 1.0 0.0 - 1.0 K/UL    ABS. EOSINOPHILS 0.1 0.0 - 0.4 K/UL    ABS. BASOPHILS 0.0 0.0 - 0.1 K/UL    ABS. IMM. GRANS. 0.1 (H) 0.00 - 0.04 K/UL    DF AUTOMATED     METABOLIC PANEL, COMPREHENSIVE    Collection Time: 04/30/22  2:24 PM   Result Value Ref Range    Sodium 141 136 - 145 mmol/L    Potassium 3.6 3.5 - 5.1 mmol/L    Chloride 102 97 - 108 mmol/L    CO2 25 21 - 32 mmol/L    Anion gap 14 5 - 15 mmol/L    Glucose 120 (H) 65 - 100 mg/dL    BUN 11 6 - 20 MG/DL    Creatinine 1.25 0.70 - 1.30 MG/DL    BUN/Creatinine ratio 9 (L) 12 - 20      GFR est AA >60 >60 ml/min/1.73m2    GFR est non-AA 57 (L) >60 ml/min/1.73m2    Calcium 8.5 8.5 - 10.1 MG/DL    Bilirubin, total 0.9 0.2 - 1.0 MG/DL    ALT (SGPT) 14 12 - 78 U/L    AST (SGOT) 15 15 - 37 U/L    Alk.  phosphatase 57 45 - 117 U/L    Protein, total 6.4 6.4 - 8.2 g/dL    Albumin 3.5 3.5 - 5.0 g/dL    Globulin 2.9 2.0 - 4.0 g/dL    A-G Ratio 1.2 1.1 - 2.2     MAGNESIUM    Collection Time: 04/30/22  2:24 PM   Result Value Ref Range    Magnesium 1.8 1.6 - 2.4 mg/dL   TROPONIN-HIGH SENSITIVITY    Collection Time: 04/30/22  2:24 PM   Result Value Ref Range    Troponin-High Sensitivity 5 0 - 76 ng/L   D DIMER    Collection Time: 04/30/22  2:24 PM   Result Value Ref Range    D-dimer 0.27 0.00 - 0.65 mg/L [facility]   EKG, 12 LEAD, INITIAL    Collection Time: 04/30/22  2:30 PM   Result Value Ref Range    Ventricular Rate 71 BPM    Atrial Rate 71 BPM    P-R Interval 208 ms    QRS Duration 80 ms    Q-T Interval 414 ms    QTC Calculation (Bezet) 449 ms    Calculated P Axis 79 degrees    Calculated R Axis 23 degrees    Calculated T Axis 63 degrees    Diagnosis       Normal sinus rhythm  Possible Left atrial enlargement  Anterior infarct , age undetermined  Abnormal ECG  No previous ECGs available         Radiologic Studies -   XR CHEST PORT   Final Result   No evidence of acute cardiopulmonary process. CT Results  (Last 48 hours)    None        CXR Results  (Last 48 hours)               04/30/22 1437  XR CHEST PORT Final result    Impression:  No evidence of acute cardiopulmonary process. Narrative:  INDICATION:  Chest Pain        FINDINGS: Single AP portable view of the chest obtained at 1427 demonstrates a   normal cardiomediastinal silhouette. The lungs are clear bilaterally. No osseous   abnormalities are seen. Medical Decision Making     IShannon MD am the first provider for this patient and am the attending of record for this patient encounter. I reviewed the vital signs, available nursing notes, past medical history, past surgical history, family history and social history. Vital Signs-Reviewed the patient's vital signs.   Patient Vitals for the past 12 hrs:   Temp Pulse Resp BP SpO2   04/30/22 1613  74 16  98 %   04/30/22 1557  82  120/63 100 %   04/30/22 1524  77  (!) 108/58 99 %   04/30/22 1502  80  136/60 94 %   04/30/22 1440  79 16 (!) 111/59 97 %   04/30/22 1416 97.5 °F (36.4 °C)       04/30/22 1413  74 14 115/60 97 %         EKG interpretation: (Preliminary)  Rhythm: normal sinus rhythm; and regular . Rate (approx.): 71; Axis: normal; P wave: normal; QRS interval: normal ; ST/T wave: normal;  was interpreted by Annika Cavazos MD,ED Provider. Records Reviewed: Nursing Notes and Old Medical Records    Provider Notes (Medical Decision Making): On presentation, the patient is well appearing in NAD with reassuring vital signs. Based on the patient's description of the event, most c/w likely vasovagal episode as he did have a prodrome and has now returned to baseline, asymptomatic. .  he is having no HA, , doubt SAH, stroke, venous sinus thrombosis. There is no head injury so CT was not obtained. There is no persistent vertigo or dizziness, neurologically intact, no hx of rapid head rotation, doubt vertebrobasilar insufficiency and vertebral dissection. Tammie Neighbours Hx and exam not consistent with seizure, GI bleed, sepsis, aortic dissection, AAA, pulmonary embolism. Pt has no CP, SOB and had a pro-drom with an EKG shows no AV block, brugada syndrome, WPW, prolonged QT, signs of R ventricular strain/LVH, ischemic changes and would otherwise have no reason to suspect cardiac arrhythmia. No arrhythmias while in the ED. There was no significant systolic ejection murmur so aortic stenosis unlikely. Patient otherwise is remained asymptomatic while in the ED and is at baseline per family. I did discuss overnight observation for further monitoring as we do not have an etiology of his syncopal episode. Patient is adamant that he would like to be discharged. Patient is clinically sober at this time and feels he is capable of making informed medical decisions. Will discharge in the care of his family. ED Course:   Initial assessment performed. The patients presenting problems have been discussed, and they are in agreement with the care plan formulated and outlined with them. I have encouraged them to ask questions as they arise throughout their visit.         Medications   sodium chloride 0.9 % bolus infusion 1,000 mL (0 mL IntraVENous IV Completed 4/30/22 2489)         PROGRESS  Cindi Hair's  results have been reviewed with him. He has been counseled regarding his diagnosis. He verbally conveys understanding and agreement of the signs, symptoms, diagnosis, treatment and prognosis and additionally agrees to follow up as recommended with Dr. None in 24 - 48 hours. He also agrees with the care-plan and conveys that all of his questions have been answered. I have also put together some discharge instructions for him that include: 1) educational information regarding their diagnosis, 2) how to care for their diagnosis at home, as well a 3) list of reasons why they would want to return to the ED prior to their follow-up appointment, should their condition change. Disposition:  home    PLAN:  1. Discharge Medication List as of 4/30/2022  4:06 PM        2. Follow-up Information     Follow up With Specialties Details Why Contact Info    [address] [place] [address] Emergency Medicine  If symptoms worsen [address] [place] [address] [facility]  Schedule an appointment as soon as possible for a visit in 2 days  [address] [place] [address]  [phone]        Return to ED if worse     Diagnosis     Clinical Impression:   1. Syncope and collapse        Please note that this dictation was completed with Dragon, computer voice recognition software. Quite often unanticipated grammatical, syntax, homophones, and other interpretive errors are inadvertently transcribed by the computer software. Please disregard these errors. Additionally, please excuse any errors that have escaped final proofreading.

## 2022-05-01 LAB
ATRIAL RATE: 71 BPM
CALCULATED P AXIS, ECG09: 79 DEGREES
CALCULATED R AXIS, ECG10: 23 DEGREES
CALCULATED T AXIS, ECG11: 63 DEGREES
DIAGNOSIS, 93000: NORMAL
P-R INTERVAL, ECG05: 208 MS
Q-T INTERVAL, ECG07: 414 MS
QRS DURATION, ECG06: 80 MS
QTC CALCULATION (BEZET), ECG08: 449 MS
VENTRICULAR RATE, ECG03: 71 BPM

## 2024-01-26 ENCOUNTER — HOSPITAL ENCOUNTER (OUTPATIENT)
Facility: HOSPITAL | Age: 74
End: 2024-01-26
Attending: OPHTHALMOLOGY
Payer: MEDICARE

## 2024-01-26 DIAGNOSIS — H57.12 PAIN IN LEFT EYE: ICD-10-CM

## 2024-01-26 DIAGNOSIS — H34.12 CENTRAL RETINAL ARTERY OCCLUSION OF LEFT EYE: ICD-10-CM

## 2024-01-26 PROCEDURE — 93880 EXTRACRANIAL BILAT STUDY: CPT

## 2024-01-27 LAB
VAS LEFT CCA DIST EDV: 11.1 CM/S
VAS LEFT CCA DIST PSV: 70.9 CM/S
VAS LEFT CCA PROX EDV: 13.9 CM/S
VAS LEFT CCA PROX PSV: 71.7 CM/S
VAS LEFT ECA EDV: 9.14 CM/S
VAS LEFT ECA PSV: 60.1 CM/S
VAS LEFT ICA DIST EDV: 12.7 CM/S
VAS LEFT ICA DIST PSV: 63.1 CM/S
VAS LEFT ICA MID EDV: 13.7 CM/S
VAS LEFT ICA MID PSV: 66 CM/S
VAS LEFT ICA PROX EDV: 12.7 CM/S
VAS LEFT ICA PROX PSV: 60.1 CM/S
VAS LEFT ICA/CCA PSV: 0.93 NO UNITS
VAS LEFT VERTEBRAL EDV: 7.4 CM/S
VAS LEFT VERTEBRAL PSV: 37.9 CM/S
VAS RIGHT CCA DIST EDV: 15.5 CM/S
VAS RIGHT CCA DIST PSV: 57.3 CM/S
VAS RIGHT CCA PROX EDV: 7.9 CM/S
VAS RIGHT CCA PROX PSV: 61.3 CM/S
VAS RIGHT ECA EDV: 8.07 CM/S
VAS RIGHT ECA PSV: 52.5 CM/S
VAS RIGHT ICA DIST EDV: 12.7 CM/S
VAS RIGHT ICA DIST PSV: 49.2 CM/S
VAS RIGHT ICA MID EDV: 14 CM/S
VAS RIGHT ICA MID PSV: 57.5 CM/S
VAS RIGHT ICA PROX EDV: 21.5 CM/S
VAS RIGHT ICA PROX PSV: 99.5 CM/S
VAS RIGHT ICA/CCA PSV: 1.7 NO UNITS
VAS RIGHT VERTEBRAL EDV: 16.82 CM/S
VAS RIGHT VERTEBRAL PSV: 53.3 CM/S